# Patient Record
Sex: FEMALE | Race: BLACK OR AFRICAN AMERICAN | NOT HISPANIC OR LATINO | Employment: UNEMPLOYED | URBAN - METROPOLITAN AREA
[De-identification: names, ages, dates, MRNs, and addresses within clinical notes are randomized per-mention and may not be internally consistent; named-entity substitution may affect disease eponyms.]

---

## 2023-05-23 ENCOUNTER — HOSPITAL ENCOUNTER (EMERGENCY)
Facility: HOSPITAL | Age: 48
Discharge: HOME | End: 2023-05-23
Attending: EMERGENCY MEDICINE

## 2023-05-23 VITALS
RESPIRATION RATE: 18 BRPM | TEMPERATURE: 98.6 F | DIASTOLIC BLOOD PRESSURE: 83 MMHG | OXYGEN SATURATION: 98 % | HEART RATE: 82 BPM | SYSTOLIC BLOOD PRESSURE: 137 MMHG

## 2023-05-23 DIAGNOSIS — E16.2 HYPOGLYCEMIA: Primary | ICD-10-CM

## 2023-05-23 LAB
GLUCOSE SERPL-MCNC: 135 MG/DL (ref 65–140)
GLUCOSE SERPL-MCNC: 135 MG/DL (ref 65–140)

## 2023-05-23 NOTE — ED PROVIDER NOTES
"History  Chief Complaint   Patient presents with   • Hypoglycemia - Symptomatic     Pt states waking up from 2 to 4am w/ low BG alarms  States feeling hunger, fatigue   on arrival  Has endocrinologist appt in July w/ [facility], concerned that it's too far out  81-year-old female presents with episodic hypoglycemia according to the continuous glucose monitor that the patient received from her primary care physician previously  Patient states it occurred again today, her glucose according to the monitor went to 69 and \"I felt super hungry, you need to get something to eat right away  \"  Patient states she drank some ginger ale and her glucose improved  Patient states this has been occurring daily since she received her monitor  Patient moved and states she no longer has follow-up with primary care and they were not able to give her any appointments until July  Patient is only on metformin and not on any insulin  Impression and plan: Reported hypoglycemia, unclear if this is secondary to accuracy of the meter  Patient does not check her blood glucose independently at the time  We will monitor patient in the emergency room and assess whether any episodes occur  Discussed high likelihood of diagnostic concern and the need for continued follow-up with endocrinology  Patient declined additional evaluation in the emergency room and she is not hypoglycemic  I did compare her meter to her fingerstick which was near normal as the patient was nearly euglycemic  Suggested patient maintain a log of her glucose measurements  We will have case management attempt to assist patient in scheduling follow-up with endocrinology  Patient also asked that I refer her to primary care and ophthalmology, which I will do so  None       Past Medical History:   Diagnosis Date   • Asthma    • Brain tumor ([facility] [address] )    • [facility] ([facility] )    • Glaucoma    • Hypertension        History reviewed   No pertinent " surgical history  History reviewed  No pertinent family history  I have reviewed and agree with the history as documented  E-Cigarette/Vaping     E-Cigarette/Vaping Substances     Social History     Tobacco Use   • Smoking status: Never   • Smokeless tobacco: Never   Substance Use Topics   • Alcohol use: Not Currently   • Drug use: Not Currently       Review of Systems    Physical Exam  Physical Exam  Vitals reviewed  HENT:      Head: Atraumatic  Eyes:      General: No scleral icterus  Pupils: Pupils are equal, round, and reactive to light  Cardiovascular:      Rate and Rhythm: Normal rate  Pulmonary:      Effort: Pulmonary effort is normal    Abdominal:      General: There is no distension  Palpations: Abdomen is soft  Tenderness: There is no abdominal tenderness  There is no guarding or rebound  Musculoskeletal:         General: No deformity  Cervical back: Neck supple  Skin:     General: Skin is warm and dry  Neurological:      Mental Status: She is alert           Vital Signs  ED Triage Vitals [05/23/23 0119]   Temperature Pulse Respirations Blood Pressure SpO2   98 6 °F (37 °C) 92 19 139/81 97 %      Temp Source Heart Rate Source Patient Position - Orthostatic VS BP Location FiO2 (%)   Oral Monitor Sitting Right arm --      Pain Score       --           Vitals:    05/23/23 0119 05/23/23 0125 05/23/23 0155 05/23/23 0200   BP: 139/81 139/81 134/80 137/83   Pulse: 92 90 82 82   Patient Position - Orthostatic VS: Sitting Sitting  Sitting         Visual Acuity      ED Medications  Medications - No data to display    Diagnostic Studies  Results Reviewed     Procedure Component Value Units Date/Time    Fingerstick Glucose (POCT) [[id number]]  (Normal) Collected: 05/23/23 0156    Lab Status: Final result Updated: 05/23/23 0207     POC Glucose 135 mg/dl     Fingerstick Glucose (POCT) [[id number]]  (Normal) Collected: 05/23/23 0119    Lab Status: Final result Updated: 05/23/23 0993 POC Glucose 135 mg/dl                  No orders to display              Procedures  Procedures         ED Course                                             MDM    Disposition  Final diagnoses:   Hypoglycemia     Time reflects when diagnosis was documented in both MDM as applicable and the Disposition within this note     Time User Action Codes Description Comment    5/23/2023  2:12 AM Rosio Muro [E16 2] Hypoglycemia       ED Disposition     ED Disposition   Discharge    Condition   Stable    Date/Time   Tue May 23, 2023  2:12 AM    Comment   Fransico Snide discharge to home/self care  Follow-up Information     Follow up With Specialties Details Why Contact Info Additional [id number] [facility] and Endocr[facility] Endocrinology Schedule an appointment as soon as possible for a visit  Follow up and reassessment, establish care with endocrinology  [facility] [id number] [address] for [facility] End[facility], [place]/[place], [phone]    [facility]  Schedule an appointment as soon as possible for a visit  Call to schedule follow up with ophthalmology [address] [facility] [address]  [address], [facility]  Nurse Practitioner Call  To schedule follow-up with primary care  [address] [place], [place] [id number] [facility] Emergency Department Emergency Medicine Go to  If symptoms worsen [address] [facility] [id number] [facility] Emergency Department, [address] [place], [place], [facility]          There are no discharge medications for this patient            PDMP Review     None          ED Provider  Electronically Signed by           Shadi Aranda MD  05/24/23 1371

## 2023-05-23 NOTE — ED NOTES
Per provider, d/c bloodwork order, pt will follow up with PCP  She has endocrinology appt in July 2023  POCT         Hazel Lackey  05/23/23 0226

## 2023-07-03 ENCOUNTER — HOSPITAL ENCOUNTER (EMERGENCY)
Facility: HOSPITAL | Age: 48
Discharge: HOME | End: 2023-07-04
Attending: EMERGENCY MEDICINE
Payer: COMMERCIAL

## 2023-07-03 VITALS
DIASTOLIC BLOOD PRESSURE: 91 MMHG | SYSTOLIC BLOOD PRESSURE: 175 MMHG | OXYGEN SATURATION: 98 % | RESPIRATION RATE: 20 BRPM | TEMPERATURE: 98.5 F | HEART RATE: 102 BPM
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
## 2023-07-03 PROCEDURE — 99283 EMERGENCY DEPT VISIT LOW MDM: CPT | Performed by: EMERGENCY MEDICINE

## 2023-07-03 PROCEDURE — 99284 EMERGENCY DEPT VISIT MOD MDM: CPT

## 2023-07-03 NOTE — Clinical Note
[place] was seen and treated in our emergency department on 7/3/2023. Diagnosis:     Jo Bermudez  may return to work on return date. She may return on this date: 07/06/2023         If you have any questions or concerns, please don't hesitate to call.       Vida Favre, MD    ______________________________           _______________          _______________  Hospital Representative                              Date                                Time
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
